# Patient Record
Sex: FEMALE | Race: OTHER | NOT HISPANIC OR LATINO | URBAN - METROPOLITAN AREA
[De-identification: names, ages, dates, MRNs, and addresses within clinical notes are randomized per-mention and may not be internally consistent; named-entity substitution may affect disease eponyms.]

---

## 2018-07-02 ENCOUNTER — OUTPATIENT (OUTPATIENT)
Dept: OUTPATIENT SERVICES | Facility: HOSPITAL | Age: 58
LOS: 1 days | Discharge: HOME | End: 2018-07-02

## 2018-07-02 ENCOUNTER — APPOINTMENT (OUTPATIENT)
Dept: OBGYN | Facility: CLINIC | Age: 58
End: 2018-07-02
Payer: COMMERCIAL

## 2018-07-02 VITALS — WEIGHT: 170 LBS | HEIGHT: 64 IN | BODY MASS INDEX: 29.02 KG/M2

## 2018-07-02 DIAGNOSIS — Z00.00 ENCOUNTER FOR GENERAL ADULT MEDICAL EXAMINATION WITHOUT ABNORMAL FINDINGS: ICD-10-CM

## 2018-07-02 LAB
BILIRUB UR QL STRIP: NORMAL
GLUCOSE UR-MCNC: NORMAL
HCG UR QL: NORMAL EU/DL
HGB UR QL STRIP.AUTO: NORMAL
KETONES UR-MCNC: NORMAL
LEUKOCYTE ESTERASE UR QL STRIP: NORMAL
NITRITE UR QL STRIP: NORMAL
PH UR STRIP: 6
PROT UR STRIP-MCNC: NORMAL
SP GR UR STRIP: 1.02

## 2018-07-02 PROCEDURE — 81003 URINALYSIS AUTO W/O SCOPE: CPT | Mod: QW

## 2018-07-02 PROCEDURE — 99396 PREV VISIT EST AGE 40-64: CPT

## 2018-07-12 LAB — HPV HIGH+LOW RISK DNA PNL CVX: NOT DETECTED

## 2018-07-17 ENCOUNTER — APPOINTMENT (OUTPATIENT)
Dept: OBGYN | Facility: CLINIC | Age: 58
End: 2018-07-17
Payer: COMMERCIAL

## 2018-07-17 PROCEDURE — 76830 TRANSVAGINAL US NON-OB: CPT

## 2018-07-17 PROCEDURE — 76856 US EXAM PELVIC COMPLETE: CPT | Mod: 59

## 2019-08-06 ENCOUNTER — APPOINTMENT (OUTPATIENT)
Dept: OBGYN | Facility: CLINIC | Age: 59
End: 2019-08-06
Payer: COMMERCIAL

## 2019-08-06 ENCOUNTER — OUTPATIENT (OUTPATIENT)
Dept: OUTPATIENT SERVICES | Facility: HOSPITAL | Age: 59
LOS: 1 days | Discharge: HOME | End: 2019-08-06

## 2019-08-06 VITALS — WEIGHT: 173 LBS | BODY MASS INDEX: 29.53 KG/M2 | HEIGHT: 64 IN

## 2019-08-06 LAB
BILIRUB UR QL STRIP: NORMAL
GLUCOSE UR-MCNC: NORMAL
HCG UR QL: 0.2 EU/DL
HGB UR QL STRIP.AUTO: NORMAL
KETONES UR-MCNC: NORMAL
LEUKOCYTE ESTERASE UR QL STRIP: NORMAL
NITRITE UR QL STRIP: NORMAL
PH UR STRIP: 5
PROT UR STRIP-MCNC: NORMAL
SP GR UR STRIP: 1.01

## 2019-08-06 PROCEDURE — 81003 URINALYSIS AUTO W/O SCOPE: CPT | Mod: QW

## 2019-08-06 PROCEDURE — 99396 PREV VISIT EST AGE 40-64: CPT

## 2019-08-07 DIAGNOSIS — Z01.411 ENCOUNTER FOR GYNECOLOGICAL EXAMINATION (GENERAL) (ROUTINE) WITH ABNORMAL FINDINGS: ICD-10-CM

## 2019-08-13 LAB — HPV HIGH+LOW RISK DNA PNL CVX: NOT DETECTED

## 2019-09-19 ENCOUNTER — APPOINTMENT (OUTPATIENT)
Dept: OBGYN | Facility: CLINIC | Age: 59
End: 2019-09-19
Payer: COMMERCIAL

## 2019-09-19 PROCEDURE — 76830 TRANSVAGINAL US NON-OB: CPT

## 2019-09-19 PROCEDURE — 77085 DXA BONE DENSITY AXL VRT FX: CPT

## 2019-09-23 ENCOUNTER — RX ONLY (RX ONLY)
Age: 59
End: 2019-09-23

## 2020-08-11 ENCOUNTER — APPOINTMENT (OUTPATIENT)
Dept: OBGYN | Facility: CLINIC | Age: 60
End: 2020-08-11

## 2020-08-25 ENCOUNTER — APPOINTMENT (OUTPATIENT)
Dept: OBGYN | Facility: CLINIC | Age: 60
End: 2020-08-25
Payer: COMMERCIAL

## 2020-08-25 VITALS
WEIGHT: 168 LBS | HEIGHT: 64 IN | SYSTOLIC BLOOD PRESSURE: 124 MMHG | BODY MASS INDEX: 28.68 KG/M2 | TEMPERATURE: 97.2 F | DIASTOLIC BLOOD PRESSURE: 86 MMHG

## 2020-08-25 PROCEDURE — 99396 PREV VISIT EST AGE 40-64: CPT

## 2020-09-02 LAB — HPV HIGH+LOW RISK DNA PNL CVX: NOT DETECTED

## 2021-08-31 ENCOUNTER — APPOINTMENT (OUTPATIENT)
Dept: OBGYN | Facility: CLINIC | Age: 61
End: 2021-08-31
Payer: COMMERCIAL

## 2021-08-31 VITALS — TEMPERATURE: 97.9 F | BODY MASS INDEX: 29.71 KG/M2 | WEIGHT: 174 LBS | HEIGHT: 64 IN

## 2021-08-31 DIAGNOSIS — R39.15 URGENCY OF URINATION: ICD-10-CM

## 2021-08-31 PROCEDURE — 99396 PREV VISIT EST AGE 40-64: CPT

## 2021-08-31 PROCEDURE — 81003 URINALYSIS AUTO W/O SCOPE: CPT | Mod: QW

## 2021-09-06 LAB
BILIRUB UR QL STRIP: NORMAL
GLUCOSE UR-MCNC: NORMAL
HCG UR QL: 0.2 EU/DL
HGB UR QL STRIP.AUTO: NORMAL
HPV HIGH+LOW RISK DNA PNL CVX: NOT DETECTED
KETONES UR-MCNC: NORMAL
LEUKOCYTE ESTERASE UR QL STRIP: NORMAL
NITRITE UR QL STRIP: NORMAL
PH UR STRIP: 6
PROT UR STRIP-MCNC: NORMAL
SP GR UR STRIP: 1.02

## 2021-09-13 LAB — CYTOLOGY CVX/VAG DOC THIN PREP: NORMAL

## 2021-11-22 ENCOUNTER — DOCTOR'S OFFICE (OUTPATIENT)
Dept: URBAN - METROPOLITAN AREA CLINIC 162 | Facility: CLINIC | Age: 61
Setting detail: OPHTHALMOLOGY
End: 2021-11-22
Payer: COMMERCIAL

## 2021-11-22 PROCEDURE — 92014 COMPRE OPH EXAM EST PT 1/>: CPT | Performed by: OPTOMETRIST

## 2021-11-22 ASSESSMENT — KERATOMETRY
OS_K2POWER_DIOPTERS: 44.50
OD_AXISANGLE_DEGREES: 093
OS_AXISANGLE_DEGREES: 72
OD_K1POWER_DIOPTERS: 43.25
OS_K1POWER_DIOPTERS: 42.75
OD_K2POWER_DIOPTERS: 44.50

## 2021-11-22 ASSESSMENT — REFRACTION_AUTOREFRACTION
OS_AXIS: 155
OS_CYLINDER: -1.00
OS_SPHERE: +0.75
OD_AXIS: 179
OD_SPHERE: +0.50
OD_CYLINDER: -0.75

## 2021-11-22 ASSESSMENT — REFRACTION_CURRENTRX
OD_SPHERE: +2.00
OD_CYLINDER: SPH
OS_SPHERE: +2.00
OD_OVR_VA: 20/
OS_VPRISM_DIRECTION: SV
OD_VPRISM_DIRECTION: SV
OS_CYLINDER: SPH
OS_OVR_VA: 20/

## 2021-11-22 ASSESSMENT — SPHEQUIV_DERIVED
OD_SPHEQUIV: 0.125
OS_SPHEQUIV: 0.25

## 2021-11-22 ASSESSMENT — CONFRONTATIONAL VISUAL FIELD TEST (CVF)
OS_FINDINGS: FULL
OD_FINDINGS: FULL

## 2021-11-22 ASSESSMENT — VISUAL ACUITY
OS_BCVA: 20/20
OD_BCVA: 20/25-2

## 2021-11-22 ASSESSMENT — AXIALLENGTH_DERIVED
OD_AL: 23.4069
OS_AL: 23.4495

## 2022-08-30 ENCOUNTER — APPOINTMENT (OUTPATIENT)
Dept: OBGYN | Facility: CLINIC | Age: 62
End: 2022-08-30

## 2022-08-30 ENCOUNTER — APPOINTMENT (OUTPATIENT)
Dept: NEUROLOGY | Facility: CLINIC | Age: 62
End: 2022-08-30

## 2022-08-30 VITALS — WEIGHT: 170 LBS | BODY MASS INDEX: 29.02 KG/M2 | TEMPERATURE: 98.1 F | HEIGHT: 64 IN

## 2022-08-30 VITALS
SYSTOLIC BLOOD PRESSURE: 131 MMHG | DIASTOLIC BLOOD PRESSURE: 79 MMHG | HEART RATE: 94 BPM | WEIGHT: 170 LBS | BODY MASS INDEX: 29.02 KG/M2 | HEIGHT: 64 IN

## 2022-08-30 LAB
BILIRUB UR QL STRIP: NORMAL
GLUCOSE UR-MCNC: NORMAL
HCG UR QL: 0.2 EU/DL
HGB UR QL STRIP.AUTO: NORMAL
KETONES UR-MCNC: NORMAL
LEUKOCYTE ESTERASE UR QL STRIP: NORMAL
NITRITE UR QL STRIP: NORMAL
PH UR STRIP: 6
PROT UR STRIP-MCNC: NORMAL
SP GR UR STRIP: 1.01

## 2022-08-30 PROCEDURE — 81003 URINALYSIS AUTO W/O SCOPE: CPT | Mod: QW

## 2022-08-30 PROCEDURE — 99396 PREV VISIT EST AGE 40-64: CPT

## 2022-08-30 PROCEDURE — 99213 OFFICE O/P EST LOW 20 MIN: CPT

## 2022-08-30 NOTE — PHYSICAL EXAM
[FreeTextEntry1] : Patient was alert and oriented, coherent relevant and appropriate.  The gait was normal.

## 2022-08-30 NOTE — ASSESSMENT
[FreeTextEntry1] : Patient will continue with her Detrol as well as her Copaxone and we will follow-up in 5 to 6 months barring problems.\par \par \par MAGDIEL Zhao, PA,C \par Jackson Marshall MD\par

## 2022-08-30 NOTE — HISTORY OF PRESENT ILLNESS
[FreeTextEntry1] : History of Present Illness\par \par Mrs. Sabra Robles is a very pleasant 61-year-old woman here for encounter regarding her stable multiple sclerosis. She is now taking Copaxone subcutaneous injection 3 times weekly and tolerating it well. She denies any difficulty with side effects and rarely has any injection site reactions. She denies any new symptoms and overall is feeling good.\par In regard to her fatigability she did trial the Adderall and does reported it worked better than the Provigil for her but only takes it sparingly..\par \par  Historically the patient was diagnosed with multiple sclerosis in 2001. She suffered the onset of pins and needles down the right side of her body slurred speech which progressively got worse over a period of days she then could not control the pen in her hand and had difficulty with a full although she was able to manipulate food onto the fork when eating she was unable to bring the phone to her mouth. She also had decreased balance. Retrospectively when she looked back she was able to realize that she would fear off and people would say ask her why she is walking into the long before her diagnosis. She was treated with steroids had consultation with Dr. Melo Villatoro and the [facility]. The MRIs were quite conclusive and no spinal tap was needed. She is never had these symptoms again but has had Bell's palsy. She reports taking good care of herself with regular exercise and good nutrition and regular healthcare. She denies any new symptomatology

## 2022-08-30 NOTE — HISTORY OF PRESENT ILLNESS
[FreeTextEntry1] : History of Present Illness\par \par Mrs. Sabra Robles is a very pleasant 61-year-old woman here for encounter regarding her stable multiple sclerosis. She is now taking Copaxone subcutaneous injection 3 times weekly and tolerating it well. She denies any difficulty with side effects and rarely has any injection site reactions. She denies any new symptoms and overall is feeling good.\par In regard to her fatigability she did trial the Adderall and does reported it worked better than the Provigil for her but only takes it sparingly..\par \par  Historically the patient was diagnosed with multiple sclerosis in 2001. She suffered the onset of pins and needles down the right side of her body slurred speech which progressively got worse over a period of days she then could not control the pen in her hand and had difficulty with a full although she was able to manipulate food onto the fork when eating she was unable to bring the phone to her mouth. She also had decreased balance. Retrospectively when she looked back she was able to realize that she would fear off and people would say ask her why she is walking into the long before her diagnosis. She was treated with steroids had consultation with Dr. Melo Villatoro and the [facility]. The MRIs were quite conclusive and no spinal tap was needed. She is never had these symptoms again but has had Bell's palsy. She reports taking good care of herself with regular exercise and good nutrition and regular healthcare. She denies any new symptomatology

## 2022-09-08 LAB — HPV HIGH+LOW RISK DNA PNL CVX: NOT DETECTED

## 2022-09-11 LAB — CYTOLOGY CVX/VAG DOC THIN PREP: NORMAL

## 2022-10-11 ENCOUNTER — DOCTOR'S OFFICE (OUTPATIENT)
Dept: URBAN - METROPOLITAN AREA CLINIC 162 | Facility: CLINIC | Age: 62
Setting detail: OPHTHALMOLOGY
End: 2022-10-11
Payer: COMMERCIAL

## 2022-10-11 PROBLEM — H25.13 CATARACT; BOTH EYES: Status: ACTIVE | Noted: 2019-09-23

## 2022-10-11 PROCEDURE — 92014 COMPRE OPH EXAM EST PT 1/>: CPT | Performed by: OPTOMETRIST

## 2022-10-11 ASSESSMENT — KERATOMETRY
OD_K2POWER_DIOPTERS: 44.75
OD_AXISANGLE_DEGREES: 097
OD_K1POWER_DIOPTERS: 43.25
OS_K2POWER_DIOPTERS: 45.00
OS_K1POWER_DIOPTERS: 42.75
OS_AXISANGLE_DEGREES: 076

## 2022-10-11 ASSESSMENT — REFRACTION_CURRENTRX
OS_VPRISM_DIRECTION: SV
OD_CYLINDER: SPH
OD_SPHERE: +1.75
OS_SPHERE: +1.75
OD_OVR_VA: 20/
OS_OVR_VA: 20/
OD_VPRISM_DIRECTION: SV
OS_CYLINDER: SPH

## 2022-10-11 ASSESSMENT — VISUAL ACUITY
OD_BCVA: 20/25
OS_BCVA: 20/20

## 2022-10-11 ASSESSMENT — REFRACTION_AUTOREFRACTION
OD_SPHERE: +0.25
OD_CYLINDER: -0.50
OS_SPHERE: +0.75
OS_CYLINDER: -1.25
OD_AXIS: 006
OS_AXIS: 162

## 2022-10-11 ASSESSMENT — AXIALLENGTH_DERIVED
OD_AL: 23.4097
OS_AL: 23.4069

## 2022-10-11 ASSESSMENT — SPHEQUIV_DERIVED
OS_SPHEQUIV: 0.125
OD_SPHEQUIV: 0

## 2022-10-11 ASSESSMENT — CONFRONTATIONAL VISUAL FIELD TEST (CVF)
OS_FINDINGS: FULL
OD_FINDINGS: FULL

## 2023-02-21 ENCOUNTER — APPOINTMENT (OUTPATIENT)
Dept: NEUROLOGY | Facility: CLINIC | Age: 63
End: 2023-02-21

## 2023-03-07 ENCOUNTER — NON-APPOINTMENT (OUTPATIENT)
Age: 63
End: 2023-03-07

## 2023-03-07 ENCOUNTER — RX RENEWAL (OUTPATIENT)
Age: 63
End: 2023-03-07

## 2023-03-07 ENCOUNTER — APPOINTMENT (OUTPATIENT)
Dept: NEUROLOGY | Facility: CLINIC | Age: 63
End: 2023-03-07
Payer: COMMERCIAL

## 2023-03-07 VITALS
HEART RATE: 80 BPM | BODY MASS INDEX: 29.02 KG/M2 | HEIGHT: 64 IN | DIASTOLIC BLOOD PRESSURE: 80 MMHG | WEIGHT: 170 LBS | SYSTOLIC BLOOD PRESSURE: 132 MMHG

## 2023-03-07 PROCEDURE — 99214 OFFICE O/P EST MOD 30 MIN: CPT

## 2023-03-07 NOTE — DISCUSSION/SUMMARY
[FreeTextEntry1] : 02/26/2020 MRI BRAIN :  \par IMPRESSION: No change since October 4, 2018. Large number of periventricular, subcortical, juxtacortical, corpus callosum and posterior fossa nonenhancing white matter lesions consistent with the clinical diagnosis of demyelinating disease.  \par \par

## 2023-03-07 NOTE — ASSESSMENT
[FreeTextEntry1] : 62 year old female with MS stable on copaxone.  We will continue on the above regimen without change and she will undergo updated MRI brain with and without contrast to monitor her disease progression.  She will f/u in 3-4 months for re assessment.\par \par I personally reviewed with the PA, this patient's history and physical exam findings, as documented above. I have discussed the relevant areas of concern, having direct implications to the presenting problems and illnesses, and I have personally examined all pertinent and positive and negative findings, which impact on the prior neurological treatment. \par \par \par Sameera Teran, MS, PA-C\par Michael Marshall MD\par

## 2023-03-07 NOTE — PHYSICAL EXAM
[General Appearance - Alert] : alert [General Appearance - In No Acute Distress] : in no acute distress [General Appearance - Well Nourished] : well nourished [General Appearance - Well Developed] : well developed [General Appearance - Well-Appearing] : healthy appearing [Oriented To Time, Place, And Person] : oriented to person, place, and time [Affect] : the affect was normal [Mood] : the mood was normal [Memory Recent] : recent memory was not impaired [Memory Remote] : remote memory was not impaired [Person] : oriented to person [Place] : oriented to place [Time] : oriented to time [Short Term Intact] : short term memory intact [Remote Intact] : remote memory intact [Registration Intact] : recent registration memory intact [Cranial Nerves Optic (II)] : visual acuity intact bilaterally,  visual fields full to confrontation, pupils equal round and reactive to light [Cranial Nerves Facial (VII)] : face symmetrical [Cranial Nerves Oculomotor (III)] : extraocular motion intact [Cranial Nerves Accessory (XI - Cranial And Spinal)] : head turning and shoulder shrug symmetric [Motor Tone] : muscle tone was normal in all four extremities [Motor Strength] : muscle strength was normal in all four extremities [Involuntary Movements] : no involuntary movements were seen

## 2023-03-07 NOTE — HISTORY OF PRESENT ILLNESS
[FreeTextEntry1] : ORIGINAL PRESENTATION:  Historically the patient was diagnosed with multiple sclerosis in 2001. She suffered the onset of pins and needles down the right side of her body slurred speech which progressively got worse over a period of days she then could not control the pen in her hand and had difficulty with a full although she was able to manipulate food onto the fork when eating she was unable to bring the phone to her mouth. She also had decreased balance. Retrospectively when she looked back she was able to realize that she would fear off and people would say ask her why she is walking into the long before her diagnosis. She was treated with steroids had consultation with Dr. Melo Villatoro and the [facility]. The MRIs were quite conclusive and no spinal tap was needed. \par \par TODAY:  I had the pleasure of seeing Ms. Robles today in follow up.  Her previous history and physical findings have been reviewed.\par \par She is under our care for MS, relapsing remitting type, which is a chronic condition she is receiving continuing active treatment for.  She is currently  managed on a regimen of Copaxone 40 mg which she injects every other day to prevent any disease progression and flare ups.  She unfortunately has been having issues with the insurance approving her medication and has been without it for 2 weeks.  I am trying to get this approved as quickly as possible so she does not suffer from a flare up.  She also uses Detrol LA when needed which is rare for urinary issues which is effective.  She is tolerating the medication well and is without complaint of side effect but does have some dimpling and hardening of skin.  We did discuss other treatment options in the office today but did not recommend changing at this time as the patient is very stable on her current therapy and the risk associated with change such as PML or hematological issues could prove destabilizing.  She has not had an updated MRI brain in 2 years and we will discuss obtaining at this time.

## 2023-03-19 ENCOUNTER — FORM ENCOUNTER (OUTPATIENT)
Age: 63
End: 2023-03-19

## 2023-04-06 ENCOUNTER — APPOINTMENT (OUTPATIENT)
Dept: NEUROLOGY | Facility: CLINIC | Age: 63
End: 2023-04-06

## 2023-06-12 ENCOUNTER — RX RENEWAL (OUTPATIENT)
Age: 63
End: 2023-06-12

## 2023-06-21 ENCOUNTER — APPOINTMENT (OUTPATIENT)
Dept: NEUROLOGY | Facility: CLINIC | Age: 63
End: 2023-06-21
Payer: COMMERCIAL

## 2023-06-21 VITALS — BODY MASS INDEX: 29.02 KG/M2 | HEIGHT: 64 IN | WEIGHT: 170 LBS

## 2023-06-21 VITALS — HEART RATE: 65 BPM | DIASTOLIC BLOOD PRESSURE: 87 MMHG | SYSTOLIC BLOOD PRESSURE: 127 MMHG

## 2023-06-21 PROCEDURE — 99214 OFFICE O/P EST MOD 30 MIN: CPT

## 2023-06-21 NOTE — ASSESSMENT
[FreeTextEntry1] : 62 year old female with MS stable on copaxone.  We will continue on the above regimen without change and she will f/u in 3-4 months for re assessment.\par \par I personally reviewed with the PA, this patient's history and physical exam findings, as documented above. I have discussed the relevant areas of concern, having direct implications to the presenting problems and illnesses, and I have personally examined all pertinent and positive and negative findings, which impact on the prior neurological treatment. \par \par \par Sameera Teran, MS, PA-C\par Michael Marshall MD\par

## 2023-06-21 NOTE — HISTORY OF PRESENT ILLNESS
[FreeTextEntry1] : ORIGINAL PRESENTATION:  Historically the patient was diagnosed with multiple sclerosis in 2001. She suffered the onset of pins and needles down the right side of her body slurred speech which progressively got worse over a period of days she then could not control the pen in her hand and had difficulty with a full although she was able to manipulate food onto the fork when eating she was unable to bring the phone to her mouth. She also had decreased balance. Retrospectively when she looked back she was able to realize that she would fear off and people would say ask her why she is walking into the long before her diagnosis. She was treated with steroids had consultation with Dr. Melo Villatoro and the [facility]. The MRIs were quite conclusive and no spinal tap was needed. \par \par TODAY:  I had the pleasure of seeing Ms. Robles today in follow up.  Her previous history and physical findings have been reviewed.\par \par She is under our care for MS, relapsing remitting type, which is a chronic condition she is receiving continuing active treatment for.  She recently underwent an MRI of the brain for further evaluation of her MS and presents to review the results.  MRI brain shows no acute intracranial hemorrhage, mass lesion, extraaxial collection, or an acute large vessel infarct.  Mild cerebral volume loss, more prominent than expected for age.  Multiple subcentimeter high flair intensity lesions consistent with MS.  3.2 x 2.2 high flair signal intensity lesion lateral to the atrium of the left ventricle consistent with a larger non-enhancing demyelinating lesion, and appear more prominent on the prior study.  Mild inflammatory changes in the paranasal sinuses and ethmoid air cells with scattered small retention cysts or polyps.    She continues to do well on a regimen of Copaxone 40 mg which she injects every other day to prevent any disease progression and flare ups.  At her last visit she advised me she was experincing issues with the insurance approving her medication but was able to get it sorted out.  She also uses Detrol LA when needed which is rare for urinary issues which is effective.  She is tolerating the medication well and is without complaint of side effect but does have some dimpling and hardening of skin.  We did discuss other treatment options in the office today but did not recommend changing at this time as the patient is very stable on her current therapy and the risk associated with change such as PML or hematological issues could prove destabilizing.

## 2023-09-05 ENCOUNTER — APPOINTMENT (OUTPATIENT)
Dept: OBGYN | Facility: CLINIC | Age: 63
End: 2023-09-05
Payer: COMMERCIAL

## 2023-09-05 VITALS — WEIGHT: 175 LBS | BODY MASS INDEX: 29.88 KG/M2 | HEIGHT: 64 IN

## 2023-09-05 DIAGNOSIS — Z01.411 ENCOUNTER FOR GYNECOLOGICAL EXAMINATION (GENERAL) (ROUTINE) WITH ABNORMAL FINDINGS: ICD-10-CM

## 2023-09-05 DIAGNOSIS — C50.919 MALIGNANT NEOPLASM OF UNSPECIFIED SITE OF UNSPECIFIED FEMALE BREAST: ICD-10-CM

## 2023-09-05 DIAGNOSIS — N95.2 POSTMENOPAUSAL ATROPHIC VAGINITIS: ICD-10-CM

## 2023-09-05 PROCEDURE — 99396 PREV VISIT EST AGE 40-64: CPT

## 2023-09-05 PROCEDURE — 81003 URINALYSIS AUTO W/O SCOPE: CPT | Mod: QW

## 2023-09-05 NOTE — HISTORY OF PRESENT ILLNESS
[FreeTextEntry1] : This 63-year-old female  2 para 2-0-0-2 is here for an annual GYN exam.  Her last Pap test was normal along with HPV testing on 2022.  She is under the care of a breast specialist since she had bilateral mastectomies performed in  for breast cancer.  She was on tamoxifen for 10 years and has been released from that treatment.  Bone density testing was excellent 2019 revealing no bone loss.  Her last pelvic ultrasound was normal with endometrium of 5 mm 2019.  She is allergic to sulfa drugs and does not smoke.  She has no GYN complaints today.

## 2023-09-05 NOTE — DISCUSSION/SUMMARY
[FreeTextEntry1] : This was an annual GYN exam in this 63-year-old female who has had bilateral mastectomies in 2008 to treat breast cancer.  She is currently in remission and is not on any medication or chemotherapeutic agents.  She will return in 1 year for an annual exam and bone density testing.  She is under the care of a breast specialist for breast evaluation.

## 2023-09-05 NOTE — PHYSICAL EXAM
[FreeTextEntry7] : Upper and lower abdomen was soft nontender with no palpable masses. [Normal] : normal [FreeTextEntry1] : External genitalia revealed mild atrophic changes but no lesions. [FreeTextEntry2] : Labia revealed mild atrophic changes also of the clitoris appeared normal and also had mild atrophic changes. [FreeTextEntry4] : The vagina revealed mild to moderate atrophic changes but no lesions. [FreeTextEntry6] : The uterus and ovaries felt to be normal size and nontender. [FreeTextEntry9] : No rectal exam was performed.

## 2023-09-07 LAB
BILIRUB UR QL STRIP: NORMAL
GLUCOSE UR-MCNC: NORMAL
HCG UR QL: 0.2 EU/DL
HGB UR QL STRIP.AUTO: NORMAL
KETONES UR-MCNC: NORMAL
LEUKOCYTE ESTERASE UR QL STRIP: NORMAL
NITRITE UR QL STRIP: NORMAL
PH UR STRIP: 5.5
PROT UR STRIP-MCNC: NORMAL
SP GR UR STRIP: 1.02

## 2023-09-11 LAB — HPV HIGH+LOW RISK DNA PNL CVX: NOT DETECTED

## 2023-09-13 LAB — CYTOLOGY CVX/VAG DOC THIN PREP: NORMAL

## 2023-09-14 ENCOUNTER — APPOINTMENT (OUTPATIENT)
Dept: NEUROLOGY | Facility: CLINIC | Age: 63
End: 2023-09-14
Payer: COMMERCIAL

## 2023-09-14 VITALS
SYSTOLIC BLOOD PRESSURE: 116 MMHG | DIASTOLIC BLOOD PRESSURE: 80 MMHG | HEART RATE: 98 BPM | HEIGHT: 64 IN | BODY MASS INDEX: 29.88 KG/M2 | WEIGHT: 175 LBS

## 2023-09-14 PROCEDURE — 99214 OFFICE O/P EST MOD 30 MIN: CPT

## 2023-09-19 RX ORDER — GLATIRAMER ACETATE 40 MG/ML
40 INJECTION, SOLUTION SUBCUTANEOUS
Qty: 36 | Refills: 0 | Status: ACTIVE | COMMUNITY
Start: 2023-02-21 | End: 1900-01-01

## 2023-10-10 ENCOUNTER — DOCTOR'S OFFICE (OUTPATIENT)
Dept: URBAN - METROPOLITAN AREA CLINIC 162 | Facility: CLINIC | Age: 63
Setting detail: OPHTHALMOLOGY
End: 2023-10-10
Payer: COMMERCIAL

## 2023-10-10 DIAGNOSIS — H25.13: ICD-10-CM

## 2023-10-10 PROBLEM — H43.811 PVD; RIGHT EYE: Status: ACTIVE | Noted: 2023-10-10

## 2023-10-10 PROCEDURE — 92014 COMPRE OPH EXAM EST PT 1/>: CPT | Performed by: OPTOMETRIST

## 2023-10-10 ASSESSMENT — SPHEQUIV_DERIVED
OS_SPHEQUIV: 0.25
OD_SPHEQUIV: 0.125

## 2023-10-10 ASSESSMENT — REFRACTION_CURRENTRX
OD_VPRISM_DIRECTION: SV
OS_CYLINDER: SPH
OD_OVR_VA: 20/
OD_SPHERE: +1.75
OD_CYLINDER: SPH
OS_OVR_VA: 20/
OS_VPRISM_DIRECTION: SV
OS_SPHERE: +1.75

## 2023-10-10 ASSESSMENT — KERATOMETRY
OS_AXISANGLE_DEGREES: 75
OD_AXISANGLE_DEGREES: 95
OS_K1POWER_DIOPTERS: 43.00
OD_K1POWER_DIOPTERS: 43.25
OD_K2POWER_DIOPTERS: 44.50
OS_K2POWER_DIOPTERS: 44.50

## 2023-10-10 ASSESSMENT — REFRACTION_AUTOREFRACTION
OS_AXIS: 155
OD_CYLINDER: -0.75
OS_SPHERE: +0.75
OS_CYLINDER: -1.00
OD_AXIS: 5
OD_SPHERE: +0.50

## 2023-10-10 ASSESSMENT — CONFRONTATIONAL VISUAL FIELD TEST (CVF)
OD_FINDINGS: FULL
OS_FINDINGS: FULL

## 2023-10-10 ASSESSMENT — VISUAL ACUITY
OS_BCVA: 20/25+
OD_BCVA: 20/25-1

## 2023-10-10 ASSESSMENT — AXIALLENGTH_DERIVED
OS_AL: 23.4042
OD_AL: 23.4069

## 2023-12-27 ENCOUNTER — RX RENEWAL (OUTPATIENT)
Age: 63
End: 2023-12-27

## 2024-01-11 ENCOUNTER — APPOINTMENT (OUTPATIENT)
Dept: NEUROLOGY | Facility: CLINIC | Age: 64
End: 2024-01-11

## 2024-01-11 ENCOUNTER — APPOINTMENT (OUTPATIENT)
Dept: NEUROLOGY | Facility: CLINIC | Age: 64
End: 2024-01-11
Payer: COMMERCIAL

## 2024-01-11 VITALS
WEIGHT: 175 LBS | BODY MASS INDEX: 29.88 KG/M2 | HEART RATE: 92 BPM | HEIGHT: 64 IN | DIASTOLIC BLOOD PRESSURE: 82 MMHG | SYSTOLIC BLOOD PRESSURE: 142 MMHG

## 2024-01-11 VITALS — WEIGHT: 175 LBS | BODY MASS INDEX: 29.88 KG/M2 | HEIGHT: 64 IN

## 2024-01-11 DIAGNOSIS — G35 MULTIPLE SCLEROSIS: ICD-10-CM

## 2024-01-11 PROCEDURE — 99213 OFFICE O/P EST LOW 20 MIN: CPT

## 2024-01-11 RX ORDER — GLATIRAMER 40 MG/ML
40 INJECTION, SOLUTION SUBCUTANEOUS
Qty: 36 | Refills: 3 | Status: ACTIVE | COMMUNITY
Start: 2023-12-27 | End: 1900-01-01

## 2024-01-11 NOTE — ASSESSMENT
[FreeTextEntry1] : 63 year old female with MS.  I will continue to prescribe Copaxone 40 mg injection 3 times a week as well as Detrol LA 4mg prn.  She will f/u in 4 months for reevaluation and is aware if there are any issues she will contact the office.  Sameera Teran MS, STEFANIE Marshall MD, Supervising Physician

## 2024-01-11 NOTE — HISTORY OF PRESENT ILLNESS
[FreeTextEntry1] : ORIGINAL PRESENTATION:  Historically the patient was diagnosed with multiple sclerosis in 2001. She suffered the onset of pins and needles down the right side of her body slurred speech which progressively got worse over a period of days she then could not control the pen in her hand and had difficulty with a full although she was able to manipulate food onto the fork when eating she was unable to bring the phone to her mouth. She also had decreased balance. Retrospectively when she looked back she was able to realize that she would fear off and people would say ask her why she is walking into the long before her diagnosis. She was treated with steroids had consultation with Dr. Melo Villatoro and the [facility]. The MRIs were quite conclusive and no spinal tap was needed.   TODAY:  I had the pleasure of seeing Ms. Robles today in follow up.  Her previous history and physical findings have been reviewed.  She is under our care for MS, relapsing remitting type, which is a chronic condition she is receiving continuing active treatment for.  She states nothing has changed over the past few months with respect to her condition.  She is currently managed on a regimen of Copaxone 40 mg which she injects every other day to prevent any disease progression and flare ups.   She does state that she does experience side effects at times when injecting including site reactions as well as nausea and body shaking but is not often.  We did discuss trialing other medication options including Tecfidera and Gilenya but she is hesitant stating she does not know if those side effects would be worse or if they will control her MS as well as the Copaxone has.  She will consider it over the next few months and in the interim wishes to continue as is without change.  She does experience urinary incontinence as a result of her MS and uses Detrol LA 4 mg which is helpful.

## 2024-01-16 ENCOUNTER — RX ONLY (RX ONLY)
Age: 64
End: 2024-01-16

## 2024-01-16 ENCOUNTER — DOCTOR'S OFFICE (OUTPATIENT)
Dept: URBAN - METROPOLITAN AREA CLINIC 162 | Facility: CLINIC | Age: 64
Setting detail: OPHTHALMOLOGY
End: 2024-01-16
Payer: COMMERCIAL

## 2024-01-16 DIAGNOSIS — H25.13: ICD-10-CM

## 2024-01-16 DIAGNOSIS — H43.811: ICD-10-CM

## 2024-01-16 DIAGNOSIS — H00.11: ICD-10-CM
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
## 2024-01-16 PROCEDURE — OPTASE EYE OPTASE EYE MASK: Performed by: OPTOMETRIST

## 2024-01-16 PROCEDURE — 92012 INTRM OPH EXAM EST PATIENT: CPT | Performed by: OPTOMETRIST

## 2024-01-16 ASSESSMENT — REFRACTION_AUTOREFRACTION
OS_CYLINDER: -1.00
OD_CYLINDER: -0.75
OS_AXIS: 155
OS_SPHERE: +0.75
OD_SPHERE: +0.50
OD_AXIS: 5

## 2024-01-16 ASSESSMENT — SPHEQUIV_DERIVED
OS_SPHEQUIV: 0.25
OD_SPHEQUIV: 0.125

## 2024-01-16 ASSESSMENT — REFRACTION_CURRENTRX
OD_OVR_VA: 20/
OS_VPRISM_DIRECTION: SV
OD_VPRISM_DIRECTION: SV
OS_SPHERE: +1.75
OD_SPHERE: +1.75
OS_CYLINDER: SPH
OS_OVR_VA: 20/
OD_CYLINDER: SPH

## 2024-02-06 ENCOUNTER — DOCTOR'S OFFICE (OUTPATIENT)
Dept: URBAN - METROPOLITAN AREA CLINIC 162 | Facility: CLINIC | Age: 64
Setting detail: OPHTHALMOLOGY
End: 2024-02-06
Payer: COMMERCIAL

## 2024-02-06 DIAGNOSIS — H00.11: ICD-10-CM

## 2024-02-06 PROCEDURE — 92012 INTRM OPH EXAM EST PATIENT: CPT | Performed by: OPTOMETRIST

## 2024-02-06 ASSESSMENT — REFRACTION_AUTOREFRACTION
OD_AXIS: 5
OS_CYLINDER: -1.00
OS_AXIS: 155
OD_CYLINDER: -0.75
OS_SPHERE: +0.75
OD_SPHERE: +0.50

## 2024-02-06 ASSESSMENT — REFRACTION_CURRENTRX
OD_OVR_VA: 20/
OS_SPHERE: +1.75
OD_SPHERE: +1.75
OS_CYLINDER: SPH
OD_CYLINDER: SPH
OS_OVR_VA: 20/
OS_VPRISM_DIRECTION: SV
OD_VPRISM_DIRECTION: SV

## 2024-02-06 ASSESSMENT — SPHEQUIV_DERIVED
OD_SPHEQUIV: 0.125
OS_SPHEQUIV: 0.25

## 2024-05-29 ENCOUNTER — APPOINTMENT (OUTPATIENT)
Dept: NEUROLOGY | Facility: CLINIC | Age: 64
End: 2024-05-29

## 2024-06-25 ENCOUNTER — RX RENEWAL (OUTPATIENT)
Age: 64
End: 2024-06-25

## 2024-06-25 RX ORDER — TOLTERODINE TARTRATE 4 MG/1
4 CAPSULE, EXTENDED RELEASE ORAL
Qty: 90 | Refills: 3 | Status: ACTIVE | COMMUNITY
Start: 2023-03-30 | End: 1900-01-01

## 2024-07-31 ENCOUNTER — APPOINTMENT (OUTPATIENT)
Dept: NEUROLOGY | Facility: CLINIC | Age: 64
End: 2024-07-31

## 2024-09-03 ENCOUNTER — APPOINTMENT (OUTPATIENT)
Dept: NEUROLOGY | Facility: CLINIC | Age: 64
End: 2024-09-03
Payer: COMMERCIAL

## 2024-09-03 VITALS — DIASTOLIC BLOOD PRESSURE: 88 MMHG | HEART RATE: 80 BPM | SYSTOLIC BLOOD PRESSURE: 142 MMHG

## 2024-09-03 VITALS — BODY MASS INDEX: 29.88 KG/M2 | HEIGHT: 64 IN | WEIGHT: 175 LBS

## 2024-09-03 PROCEDURE — 99213 OFFICE O/P EST LOW 20 MIN: CPT

## 2024-09-03 NOTE — HISTORY OF PRESENT ILLNESS
[FreeTextEntry1] : ORIGINAL PRESENTATION:  Historically the patient was diagnosed with multiple sclerosis in 2001. She suffered the onset of pins and needles down the right side of her body slurred speech which progressively got worse over a period of days she then could not control the pen in her hand and had difficulty with a full although she was able to manipulate food onto the fork when eating she was unable to bring the phone to her mouth. She also had decreased balance. Retrospectively when she looked back she was able to realize that she would fear off and people would say ask her why she is walking into the long before her diagnosis. She was treated with steroids had consultation with Dr. Melo Villatoro and the [facility]. The MRIs were quite conclusive and no spinal tap was needed.   TODAY:  I had the pleasure of seeing Ms. Robles today in follow up.  Her previous history and physical findings have been reviewed.  Ms. Robles remains under our care for management of MS, relapsing remitting type. She was last seen in our office 1.11.24, previously under the care of MARC Teran. For management of this condition, she remains on a regimen of Copaxone 40 mg which she injects every other day to prevent any disease progression and flare ups. She states nothing has changed over the past few months with respect to her condition She does state that she does experience side effects at times when injecting including site reactions as well as nausea and body shaking but is not often. We did discuss trialing other medication options including Tecfidera and Gilenya but she is hesitant stating she does not know if those side effects would be worse or if they will control her MS as well as the Copaxone has. She will consider it over the next few months and in the interim wishes to continue as is without change. She does experience urinary incontinence as a result of her MS and uses Detrol LA 4 mg which is helpful.

## 2024-09-03 NOTE — ASSESSMENT
[FreeTextEntry1] : 64 year old female with MS.  I will continue to prescribe Copaxone 40 mg injection 3 times a week as well as Detrol LA 4mg prn.  She will f/u in 4 months for reevaluation and is aware if there are any issues she will contact the office.  Supervising Physician : Michael Marshall MD

## 2024-09-09 ENCOUNTER — APPOINTMENT (OUTPATIENT)
Dept: OBGYN | Facility: CLINIC | Age: 64
End: 2024-09-09
Payer: COMMERCIAL

## 2024-09-09 VITALS — HEIGHT: 64 IN | BODY MASS INDEX: 30.39 KG/M2 | WEIGHT: 178 LBS

## 2024-09-09 DIAGNOSIS — Z01.411 ENCOUNTER FOR GYNECOLOGICAL EXAMINATION (GENERAL) (ROUTINE) WITH ABNORMAL FINDINGS: ICD-10-CM

## 2024-09-09 DIAGNOSIS — C50.919 MALIGNANT NEOPLASM OF UNSPECIFIED SITE OF UNSPECIFIED FEMALE BREAST: ICD-10-CM

## 2024-09-09 DIAGNOSIS — M81.0 AGE-RELATED OSTEOPOROSIS W/OUT CURRENT PATHOLOGICAL FRACTURE: ICD-10-CM

## 2024-09-09 DIAGNOSIS — G35 MULTIPLE SCLEROSIS: ICD-10-CM

## 2024-09-09 DIAGNOSIS — R39.15 URGENCY OF URINATION: ICD-10-CM

## 2024-09-09 DIAGNOSIS — N95.2 POSTMENOPAUSAL ATROPHIC VAGINITIS: ICD-10-CM

## 2024-09-09 PROCEDURE — 77080 DXA BONE DENSITY AXIAL: CPT

## 2024-09-09 PROCEDURE — 99396 PREV VISIT EST AGE 40-64: CPT

## 2024-09-09 NOTE — HISTORY OF PRESENT ILLNESS
[FreeTextEntry1] : This 64-year-old female  2 para 2-0-0-2 is here for an annual GYN exam.  Her last Pap test was negative along with HPV testing on 2023.  She has a history of a bilateral mastectomy in  to treat bilateral breast cancer.  She has been in remission for the last more than 10 years.  Bone density testing was last performed in 2019.  Will repeat the bone density testing today.  She has no other GYN complaints today.  She will continue with her future breast evaluations in this office on an annual basis.

## 2024-09-09 NOTE — DISCUSSION/SUMMARY
[FreeTextEntry1] : This was an annual GYN exam in this 64-year-old female.  She does have a history of bilateral mastectomies in 2008 to treat bilateral breast cancer.  She has been in remission for more than 10 years.  Pap testing was performed today.  The patient will also have bone density testing performed.  Bone density testing was performed today and revealed minimal bone loss.  This is a very good result for this age group.  She will otherwise return to this office as needed or in 1 year for her next annual exam and Pap test.  The patient agrees with the recommendations and the plan.

## 2024-09-13 LAB
APPEARANCE: CLEAR
BILIRUBIN URINE: NEGATIVE
BLOOD URINE: NEGATIVE
COLOR: YELLOW
CYTOLOGY CVX/VAG DOC THIN PREP: NORMAL
GLUCOSE QUALITATIVE U: NEGATIVE
KETONES URINE: NEGATIVE
LEUKOCYTE ESTERASE URINE: NEGATIVE
NITRITE URINE: NEGATIVE
PH URINE: 6
PROTEIN URINE: NEGATIVE
SPECIFIC GRAVITY URINE: 1.02
UROBILINOGEN URINE: 0.2 (ref 0.2–?)

## 2024-10-24 ENCOUNTER — DOCTOR'S OFFICE (OUTPATIENT)
Dept: URBAN - METROPOLITAN AREA CLINIC 162 | Facility: CLINIC | Age: 64
Setting detail: OPHTHALMOLOGY
End: 2024-10-24
Payer: COMMERCIAL

## 2024-10-24 DIAGNOSIS — H25.13: ICD-10-CM

## 2024-10-24 DIAGNOSIS — H43.811: ICD-10-CM

## 2024-10-24 PROCEDURE — 92014 COMPRE OPH EXAM EST PT 1/>: CPT | Performed by: OPTOMETRIST

## 2024-10-24 ASSESSMENT — REFRACTION_CURRENTRX
OD_CYLINDER: SPH
OS_OVR_VA: 20/
OS_CYLINDER: SPH
OS_VPRISM_DIRECTION: SV
OD_VPRISM_DIRECTION: SV
OD_SPHERE: +1.75
OS_SPHERE: +1.75
OD_OVR_VA: 20/

## 2024-10-24 ASSESSMENT — KERATOMETRY
OS_K1POWER_DIOPTERS: 43.25
OD_K2POWER_DIOPTERS: 44.25
OD_K1POWER_DIOPTERS: 43.00
OS_K2POWER_DIOPTERS: 45.50
OS_AXISANGLE_DEGREES: 75
OD_AXISANGLE_DEGREES: 95

## 2024-10-24 ASSESSMENT — CONFRONTATIONAL VISUAL FIELD TEST (CVF)
OS_FINDINGS: FULL
OD_FINDINGS: FULL

## 2024-10-24 ASSESSMENT — REFRACTION_AUTOREFRACTION
OS_SPHERE: +0.75
OD_AXIS: 5
OS_AXIS: 160
OD_CYLINDER: -0.50
OS_CYLINDER: -1.50
OD_SPHERE: +0.75

## 2024-10-24 ASSESSMENT — VISUAL ACUITY
OS_BCVA: 20/25
OD_BCVA: 20/30

## 2025-02-06 ENCOUNTER — APPOINTMENT (OUTPATIENT)
Dept: NEUROLOGY | Facility: CLINIC | Age: 65
End: 2025-02-06
Payer: COMMERCIAL

## 2025-02-06 PROCEDURE — 99213 OFFICE O/P EST LOW 20 MIN: CPT

## 2025-03-26 ENCOUNTER — NON-APPOINTMENT (OUTPATIENT)
Age: 65
End: 2025-03-26

## 2025-03-26 ENCOUNTER — APPOINTMENT (OUTPATIENT)
Dept: NEUROLOGY | Facility: CLINIC | Age: 65
End: 2025-03-26
Payer: COMMERCIAL

## 2025-03-26 VITALS
SYSTOLIC BLOOD PRESSURE: 144 MMHG | BODY MASS INDEX: 30.22 KG/M2 | WEIGHT: 177 LBS | HEIGHT: 64 IN | DIASTOLIC BLOOD PRESSURE: 85 MMHG | HEART RATE: 78 BPM | OXYGEN SATURATION: 95 % | TEMPERATURE: 97.7 F

## 2025-03-26 DIAGNOSIS — G35 MULTIPLE SCLEROSIS: ICD-10-CM

## 2025-03-26 PROCEDURE — G2211 COMPLEX E/M VISIT ADD ON: CPT | Mod: NC

## 2025-03-26 PROCEDURE — 99205 OFFICE O/P NEW HI 60 MIN: CPT

## 2025-04-04 LAB
25(OH)D3 SERPL-MCNC: 54 NG/ML
ALBUMIN SERPL ELPH-MCNC: 4.5 G/DL
ALP BLD-CCNC: 83 U/L
ALT SERPL-CCNC: 24 U/L
ANION GAP SERPL CALC-SCNC: 9 MMOL/L
AST SERPL-CCNC: 18 U/L
BASOPHILS # BLD AUTO: 0.05 K/UL
BASOPHILS NFR BLD AUTO: 0.7 %
BILIRUB SERPL-MCNC: 0.4 MG/DL
BUN SERPL-MCNC: 16 MG/DL
CALCIUM SERPL-MCNC: 9.8 MG/DL
CD16+CD56+ CELLS # BLD: 379 CELLS/UL
CD16+CD56+ CELLS NFR BLD: 13 %
CD19 CELLS NFR BLD: 483 CELLS/UL
CD3 CELLS # BLD: 1967 CELLS/UL
CD3 CELLS NFR BLD: 68 %
CD3+CD4+ CELLS # BLD: 1537 CELLS/UL
CD3+CD4+ CELLS NFR BLD: 52 %
CD3+CD4+ CELLS/CD3+CD8+ CLL SPEC: 3.41 RATIO
CD3+CD8+ CELLS # SPEC: 451 CELLS/UL
CD3+CD8+ CELLS NFR BLD: 15 %
CELLS.CD3-CD19+/CELLS IN BLOOD: 17 %
CHLORIDE SERPL-SCNC: 104 MMOL/L
CO2 SERPL-SCNC: 27 MMOL/L
CREAT SERPL-MCNC: 0.69 MG/DL
DEPRECATED KAPPA LC FREE/LAMBDA SER: 1.12 RATIO
EGFRCR SERPLBLD CKD-EPI 2021: 98 ML/MIN/1.73M2
EOSINOPHIL # BLD AUTO: 0.11 K/UL
EOSINOPHIL NFR BLD AUTO: 1.6 %
GLUCOSE SERPL-MCNC: 87 MG/DL
HBV CORE IGG+IGM SER QL: NONREACTIVE
HBV CORE IGM SER QL: NONREACTIVE
HBV SURFACE AB SER QL: NONREACTIVE
HCT VFR BLD CALC: 43.7 %
HGB BLD-MCNC: 14.3 G/DL
HIV1+2 AB SPEC QL IA.RAPID: NONREACTIVE
IGA SER QL IEP: 258 MG/DL
IGG SER QL IEP: 1235 MG/DL
IGM SER QL IEP: 117 MG/DL
IMM GRANULOCYTES NFR BLD AUTO: 0.1 %
KAPPA LC CSF-MCNC: 2.61 MG/DL
KAPPA LC SERPL-MCNC: 2.92 MG/DL
LYMPHOCYTES # BLD AUTO: 3.08 K/UL
LYMPHOCYTES NFR BLD AUTO: 43.8 %
MAN DIFF?: NORMAL
MCHC RBC-ENTMCNC: 31 PG
MCHC RBC-ENTMCNC: 32.7 G/DL
MCV RBC AUTO: 94.8 FL
MONOCYTES # BLD AUTO: 0.5 K/UL
MONOCYTES NFR BLD AUTO: 7.1 %
NEUTROPHILS # BLD AUTO: 3.29 K/UL
NEUTROPHILS NFR BLD AUTO: 46.7 %
PLATELET # BLD AUTO: 253 K/UL
POTASSIUM SERPL-SCNC: 4.4 MMOL/L
PROT SERPL-MCNC: 7.5 G/DL
RBC # BLD: 4.61 M/UL
RBC # FLD: 13.2 %
SODIUM SERPL-SCNC: 141 MMOL/L
T PALLIDUM AB SER QL IA: NEGATIVE
VIABILITY: NORMAL
VZV AB TITR SER: POSITIVE
VZV IGG SER IF-ACNC: 1.16 S/CO
WBC # FLD AUTO: 7.04 K/UL

## 2025-04-09 ENCOUNTER — OUTPATIENT (OUTPATIENT)
Dept: OUTPATIENT SERVICES | Facility: HOSPITAL | Age: 65
LOS: 1 days | End: 2025-04-09
Payer: COMMERCIAL

## 2025-04-09 DIAGNOSIS — Z00.8 ENCOUNTER FOR OTHER GENERAL EXAMINATION: ICD-10-CM

## 2025-04-09 DIAGNOSIS — G35 MULTIPLE SCLEROSIS: ICD-10-CM

## 2025-04-09 PROCEDURE — 70553 MRI BRAIN STEM W/O & W/DYE: CPT

## 2025-04-09 PROCEDURE — 76377 3D RENDER W/INTRP POSTPROCES: CPT

## 2025-04-09 PROCEDURE — 76377 3D RENDER W/INTRP POSTPROCES: CPT | Mod: 26

## 2025-04-09 PROCEDURE — 70553 MRI BRAIN STEM W/O & W/DYE: CPT | Mod: 26

## 2025-04-09 PROCEDURE — A9579: CPT

## 2025-04-10 DIAGNOSIS — G35 MULTIPLE SCLEROSIS: ICD-10-CM

## 2025-04-14 ENCOUNTER — RX RENEWAL (OUTPATIENT)
Age: 65
End: 2025-04-14

## 2025-05-07 ENCOUNTER — RESULT REVIEW (OUTPATIENT)
Age: 65
End: 2025-05-07

## 2025-05-07 ENCOUNTER — OUTPATIENT (OUTPATIENT)
Dept: OUTPATIENT SERVICES | Facility: HOSPITAL | Age: 65
LOS: 1 days | End: 2025-05-07
Payer: COMMERCIAL

## 2025-05-07 DIAGNOSIS — Z00.8 ENCOUNTER FOR OTHER GENERAL EXAMINATION: ICD-10-CM

## 2025-05-07 DIAGNOSIS — G35 MULTIPLE SCLEROSIS: ICD-10-CM

## 2025-05-07 PROCEDURE — A9579: CPT

## 2025-05-07 PROCEDURE — 72156 MRI NECK SPINE W/O & W/DYE: CPT | Mod: 26

## 2025-05-07 PROCEDURE — 72156 MRI NECK SPINE W/O & W/DYE: CPT

## 2025-05-07 PROCEDURE — 72157 MRI CHEST SPINE W/O & W/DYE: CPT

## 2025-05-07 PROCEDURE — 72157 MRI CHEST SPINE W/O & W/DYE: CPT | Mod: 26

## 2025-05-08 DIAGNOSIS — G35 MULTIPLE SCLEROSIS: ICD-10-CM

## 2025-07-09 ENCOUNTER — APPOINTMENT (OUTPATIENT)
Dept: NEUROLOGY | Facility: CLINIC | Age: 65
End: 2025-07-09
Payer: COMMERCIAL

## 2025-07-09 PROCEDURE — G2211 COMPLEX E/M VISIT ADD ON: CPT | Mod: NC,95

## 2025-07-09 PROCEDURE — 99215 OFFICE O/P EST HI 40 MIN: CPT | Mod: 95

## 2025-08-04 ENCOUNTER — OUTPATIENT (OUTPATIENT)
Dept: OUTPATIENT SERVICES | Facility: HOSPITAL | Age: 65
LOS: 1 days | End: 2025-08-04
Payer: COMMERCIAL

## 2025-08-04 ENCOUNTER — RESULT REVIEW (OUTPATIENT)
Age: 65
End: 2025-08-04

## 2025-08-04 DIAGNOSIS — Z00.8 ENCOUNTER FOR OTHER GENERAL EXAMINATION: ICD-10-CM

## 2025-08-04 DIAGNOSIS — G35 MULTIPLE SCLEROSIS: ICD-10-CM

## 2025-08-04 PROCEDURE — 70553 MRI BRAIN STEM W/O & W/DYE: CPT | Mod: 26

## 2025-08-04 PROCEDURE — 70553 MRI BRAIN STEM W/O & W/DYE: CPT

## 2025-08-04 PROCEDURE — A9579: CPT

## 2025-08-05 DIAGNOSIS — G35 MULTIPLE SCLEROSIS: ICD-10-CM

## 2025-08-11 ENCOUNTER — NON-APPOINTMENT (OUTPATIENT)
Age: 65
End: 2025-08-11

## 2025-08-11 DIAGNOSIS — G35 MULTIPLE SCLEROSIS: ICD-10-CM

## 2025-08-15 RX ORDER — DIROXIMEL FUMARATE 231 MG/1
231 CAPSULE ORAL
Qty: 120 | Refills: 5 | Status: ACTIVE | COMMUNITY
Start: 2025-08-11 | End: 1900-01-01

## 2025-08-15 RX ORDER — DIROXIMEL FUMARATE 231 MG/1
231 CAPSULE ORAL
Qty: 120 | Refills: 0 | Status: ACTIVE | COMMUNITY
Start: 2025-08-11 | End: 1900-01-01

## 2025-08-25 ENCOUNTER — RX RENEWAL (OUTPATIENT)
Age: 65
End: 2025-08-25

## 2025-09-15 ENCOUNTER — APPOINTMENT (OUTPATIENT)
Dept: OBGYN | Facility: CLINIC | Age: 65
End: 2025-09-15

## 2025-09-18 ENCOUNTER — LABORATORY RESULT (OUTPATIENT)
Age: 65
End: 2025-09-18

## 2025-09-18 ENCOUNTER — APPOINTMENT (OUTPATIENT)
Dept: OBGYN | Facility: CLINIC | Age: 65
End: 2025-09-18
Payer: COMMERCIAL

## 2025-09-18 ENCOUNTER — NON-APPOINTMENT (OUTPATIENT)
Age: 65
End: 2025-09-18

## 2025-09-18 VITALS — BODY MASS INDEX: 29.88 KG/M2 | TEMPERATURE: 97.8 F | HEIGHT: 64 IN | WEIGHT: 175 LBS

## 2025-09-18 DIAGNOSIS — N81.10 CYSTOCELE, UNSPECIFIED: ICD-10-CM

## 2025-09-18 DIAGNOSIS — G35 MULTIPLE SCLEROSIS: ICD-10-CM

## 2025-09-18 DIAGNOSIS — Z78.0 ASYMPTOMATIC MENOPAUSAL STATE: ICD-10-CM

## 2025-09-18 DIAGNOSIS — Z01.419 ENCOUNTER FOR GYNECOLOGICAL EXAMINATION (GENERAL) (ROUTINE) W/OUT ABNORMAL FINDINGS: ICD-10-CM

## 2025-09-18 DIAGNOSIS — C50.919 MALIGNANT NEOPLASM OF UNSPECIFIED SITE OF UNSPECIFIED FEMALE BREAST: ICD-10-CM

## 2025-09-18 LAB
APPEARANCE: CLEAR
BILIRUBIN URINE: NEGATIVE
BLOOD URINE: NEGATIVE
COLOR: YELLOW
GLUCOSE QUALITATIVE U: NEGATIVE
KETONES URINE: NEGATIVE
LEUKOCYTE ESTERASE URINE: NEGATIVE
NITRITE URINE: NEGATIVE
PH URINE: 5.5
PROTEIN URINE: NEGATIVE
SPECIFIC GRAVITY URINE: >=1.03
UROBILINOGEN URINE: 0.2 (ref 0.2–?)

## 2025-09-18 PROCEDURE — 99396 PREV VISIT EST AGE 40-64: CPT

## 2025-09-21 PROBLEM — Z78.0 MENOPAUSE: Status: ACTIVE | Noted: 2025-09-21

## 2025-09-21 PROBLEM — N81.10 FEMALE CYSTOCELE: Status: ACTIVE | Noted: 2025-09-21

## 2025-09-21 PROBLEM — Z01.419 WELL WOMAN EXAM WITH ROUTINE GYNECOLOGICAL EXAM: Status: ACTIVE | Noted: 2025-09-21
